# Patient Record
Sex: FEMALE | Race: WHITE | NOT HISPANIC OR LATINO
[De-identification: names, ages, dates, MRNs, and addresses within clinical notes are randomized per-mention and may not be internally consistent; named-entity substitution may affect disease eponyms.]

---

## 2017-10-03 ENCOUNTER — APPOINTMENT (OUTPATIENT)
Dept: OBGYN | Facility: CLINIC | Age: 67
End: 2017-10-03
Payer: MEDICARE

## 2017-10-03 VITALS
SYSTOLIC BLOOD PRESSURE: 116 MMHG | HEIGHT: 61 IN | WEIGHT: 123 LBS | DIASTOLIC BLOOD PRESSURE: 66 MMHG | BODY MASS INDEX: 23.22 KG/M2

## 2017-10-03 PROCEDURE — 99214 OFFICE O/P EST MOD 30 MIN: CPT

## 2017-12-19 ENCOUNTER — APPOINTMENT (OUTPATIENT)
Dept: OBGYN | Facility: CLINIC | Age: 67
End: 2017-12-19
Payer: MEDICARE

## 2017-12-19 VITALS
BODY MASS INDEX: 22.66 KG/M2 | HEIGHT: 61 IN | WEIGHT: 120 LBS | SYSTOLIC BLOOD PRESSURE: 112 MMHG | DIASTOLIC BLOOD PRESSURE: 70 MMHG

## 2017-12-19 LAB
BILIRUB UR QL STRIP: NORMAL
COLLECTION METHOD: NORMAL
GLUCOSE UR-MCNC: NORMAL
HCG UR QL: 0.2 EU/DL
HGB UR QL STRIP.AUTO: NORMAL
KETONES UR-MCNC: NORMAL
LEUKOCYTE ESTERASE UR QL STRIP: NORMAL
NITRITE UR QL STRIP: NORMAL
PH UR STRIP: 6
PROT UR STRIP-MCNC: NORMAL
SP GR UR STRIP: <=1.005

## 2017-12-19 PROCEDURE — 99406 BEHAV CHNG SMOKING 3-10 MIN: CPT

## 2017-12-19 PROCEDURE — 81003 URINALYSIS AUTO W/O SCOPE: CPT | Mod: QW

## 2017-12-19 PROCEDURE — G0101: CPT

## 2017-12-26 LAB — CYTOLOGY CVX/VAG DOC THIN PREP: NORMAL

## 2018-05-04 ENCOUNTER — EMERGENCY (EMERGENCY)
Facility: HOSPITAL | Age: 68
LOS: 1 days | End: 2018-05-04
Payer: MEDICARE

## 2018-05-04 PROCEDURE — 70450 CT HEAD/BRAIN W/O DYE: CPT | Mod: 26

## 2018-05-04 PROCEDURE — 72125 CT NECK SPINE W/O DYE: CPT | Mod: 26

## 2018-05-04 PROCEDURE — 99285 EMERGENCY DEPT VISIT HI MDM: CPT

## 2018-05-04 PROCEDURE — 71045 X-RAY EXAM CHEST 1 VIEW: CPT | Mod: 26

## 2018-05-15 ENCOUNTER — OUTPATIENT (OUTPATIENT)
Dept: OUTPATIENT SERVICES | Facility: HOSPITAL | Age: 68
LOS: 1 days | End: 2018-05-15

## 2018-08-09 ENCOUNTER — OUTPATIENT (OUTPATIENT)
Dept: OUTPATIENT SERVICES | Facility: HOSPITAL | Age: 68
LOS: 1 days | End: 2018-08-09

## 2018-12-03 ENCOUNTER — OTHER (OUTPATIENT)
Age: 68
End: 2018-12-03

## 2018-12-20 ENCOUNTER — APPOINTMENT (OUTPATIENT)
Dept: OBGYN | Facility: CLINIC | Age: 68
End: 2018-12-20
Payer: MEDICARE

## 2018-12-20 VITALS
DIASTOLIC BLOOD PRESSURE: 66 MMHG | WEIGHT: 122 LBS | BODY MASS INDEX: 23.03 KG/M2 | HEIGHT: 61 IN | SYSTOLIC BLOOD PRESSURE: 115 MMHG

## 2018-12-20 LAB
BILIRUB UR QL STRIP: NORMAL
COLLECTION METHOD: NORMAL
GLUCOSE UR-MCNC: NORMAL
HCG UR QL: 0.2 EU/DL
HGB UR QL STRIP.AUTO: NORMAL
KETONES UR-MCNC: NORMAL
LEUKOCYTE ESTERASE UR QL STRIP: NORMAL
NITRITE UR QL STRIP: NORMAL
PH UR STRIP: 6
PROT UR STRIP-MCNC: NORMAL
SP GR UR STRIP: 1.01

## 2018-12-20 PROCEDURE — 81003 URINALYSIS AUTO W/O SCOPE: CPT | Mod: QW

## 2018-12-20 PROCEDURE — 99214 OFFICE O/P EST MOD 30 MIN: CPT

## 2018-12-20 RX ORDER — MELATONIN 3 MG
TABLET ORAL
Refills: 0 | Status: ACTIVE | COMMUNITY

## 2018-12-20 RX ORDER — RANITIDINE 75 MG/1
TABLET ORAL
Refills: 0 | Status: ACTIVE | COMMUNITY

## 2018-12-26 LAB — CYTOLOGY CVX/VAG DOC THIN PREP: NORMAL

## 2019-09-27 ENCOUNTER — TRANSCRIPTION ENCOUNTER (OUTPATIENT)
Age: 69
End: 2019-09-27

## 2020-05-06 ENCOUNTER — APPOINTMENT (OUTPATIENT)
Dept: OBGYN | Facility: CLINIC | Age: 70
End: 2020-05-06

## 2020-09-29 ENCOUNTER — OUTPATIENT (OUTPATIENT)
Dept: OUTPATIENT SERVICES | Facility: HOSPITAL | Age: 70
LOS: 1 days | End: 2020-09-29

## 2020-10-12 ENCOUNTER — APPOINTMENT (OUTPATIENT)
Dept: MRI IMAGING | Facility: CLINIC | Age: 70
End: 2020-10-12
Payer: MEDICARE

## 2020-10-12 PROCEDURE — 73721 MRI JNT OF LWR EXTRE W/O DYE: CPT | Mod: LT

## 2021-09-13 ENCOUNTER — EMERGENCY (EMERGENCY)
Facility: HOSPITAL | Age: 71
LOS: 1 days | End: 2021-09-13
Admitting: EMERGENCY MEDICINE
Payer: MEDICARE

## 2021-09-13 ENCOUNTER — TRANSCRIPTION ENCOUNTER (OUTPATIENT)
Age: 71
End: 2021-09-13

## 2021-09-13 PROCEDURE — 99283 EMERGENCY DEPT VISIT LOW MDM: CPT | Mod: CS

## 2021-09-14 ENCOUNTER — TRANSCRIPTION ENCOUNTER (OUTPATIENT)
Age: 71
End: 2021-09-14

## 2021-09-14 ENCOUNTER — APPOINTMENT (OUTPATIENT)
Dept: DISASTER EMERGENCY | Facility: HOSPITAL | Age: 71
End: 2021-09-14

## 2021-09-14 ENCOUNTER — OUTPATIENT (OUTPATIENT)
Dept: INPATIENT UNIT | Facility: HOSPITAL | Age: 71
LOS: 1 days | End: 2021-09-14
Payer: MEDICARE

## 2021-09-14 VITALS
TEMPERATURE: 98 F | HEART RATE: 80 BPM | RESPIRATION RATE: 18 BRPM | SYSTOLIC BLOOD PRESSURE: 90 MMHG | DIASTOLIC BLOOD PRESSURE: 58 MMHG | OXYGEN SATURATION: 96 %

## 2021-09-14 VITALS
DIASTOLIC BLOOD PRESSURE: 72 MMHG | HEART RATE: 86 BPM | TEMPERATURE: 99 F | SYSTOLIC BLOOD PRESSURE: 107 MMHG | OXYGEN SATURATION: 96 % | RESPIRATION RATE: 18 BRPM

## 2021-09-14 DIAGNOSIS — U07.1 COVID-19: ICD-10-CM

## 2021-09-14 PROCEDURE — M0243: CPT

## 2021-09-14 RX ORDER — SODIUM CHLORIDE 9 MG/ML
250 INJECTION INTRAMUSCULAR; INTRAVENOUS; SUBCUTANEOUS
Refills: 0 | Status: COMPLETED | OUTPATIENT
Start: 2021-09-14 | End: 2021-09-14

## 2021-09-14 RX ADMIN — SODIUM CHLORIDE 310 MILLILITER(S): 9 INJECTION INTRAMUSCULAR; INTRAVENOUS; SUBCUTANEOUS at 15:08

## 2021-09-14 NOTE — CHART NOTE - NSCHARTNOTEFT_GEN_A_CORE
CC: Monoclonal Antibody Infusion/COVID 19 Positive  71yFemale with pmhx of hypothyroid presents for monoclonal antibody infusion. Patient endorses onset of symptoms 9/9 consisting of HA, fever & cough, tested + for COVID 9/13, and referred by  provider for infusion. Patient is unvaccinated.    exam/findings:  T(C): 37.2 (09-14-21 @ 13:19), Max: 37.2 (09-14-21 @ 13:19)  HR: 86 (09-14-21 @ 13:19) (86 - 86)  BP: 107/72 (09-14-21 @ 13:19) (107/72 - 107/72)  RR: 18 (09-14-21 @ 13:19) (18 - 18)  SpO2: 96% (09-14-21 @ 13:19) (96% - 96%)      PE:   Appearance: NAD		  Skin: warm and dry  Neurologic: Non-focal  Extremities: Normal range of motion,    ASSESSMENT:  Pt is a 71yFemale with pmhx of hypothyroid, tested + for COVID 9/13, and referred by  provider to infusion center for Monoclonal antibody infusion (Casirivimab/imdevimab)  Symptoms/ Criteria:  HA, fever & cough  Risk Profile includes: Age >65    PLAN:  - Infusion procedure explained to patient   - Consent for monoclonal antibody infusion obtained   - Risk & benefits discussed/all questions answered  - Infuse Casirivimab/imdevimab 600mg IV over 21 mins   - Observe patient for one hour post infusion    I have reviewed the Casirivimab/imdevimab Emergency Use Authorization (EUA) and I have provided the patient or patient's caregiver with the following information:      1. FDA has authorized emergency use Casirivimab/imdevimab, which is not an FDA-approved biological product.  2. The patient or patient's caregiver has the option to accept or refuse administration of Casirivimab/imdevimab.   3. The significant known and potential risks and benefits of Casirivimab/imdevimab and the extent to which such risks and benefits are unknown.  4. Information on available alternative treatments and risks and benefits of those alternatives.

## 2021-09-16 ENCOUNTER — TRANSCRIPTION ENCOUNTER (OUTPATIENT)
Age: 71
End: 2021-09-16

## 2021-10-14 ENCOUNTER — APPOINTMENT (OUTPATIENT)
Dept: OBGYN | Facility: CLINIC | Age: 71
End: 2021-10-14
Payer: MEDICARE

## 2021-10-14 ENCOUNTER — NON-APPOINTMENT (OUTPATIENT)
Age: 71
End: 2021-10-14

## 2021-10-14 VITALS
BODY MASS INDEX: 23.6 KG/M2 | HEIGHT: 61 IN | DIASTOLIC BLOOD PRESSURE: 76 MMHG | SYSTOLIC BLOOD PRESSURE: 116 MMHG | WEIGHT: 125 LBS

## 2021-10-14 DIAGNOSIS — Z01.419 ENCOUNTER FOR GYNECOLOGICAL EXAMINATION (GENERAL) (ROUTINE) W/OUT ABNORMAL FINDINGS: ICD-10-CM

## 2021-10-14 PROCEDURE — G0101: CPT

## 2021-10-14 PROCEDURE — 82270 OCCULT BLOOD FECES: CPT

## 2021-10-14 NOTE — PHYSICAL EXAM
[Appropriately responsive] : appropriately responsive [Alert] : alert [No Acute Distress] : no acute distress [No Murmurs] : no murmurs [Soft] : soft [Non-tender] : non-tender [Non-distended] : non-distended [No HSM] : No HSM [No Lesions] : no lesions [No Mass] : no mass [Oriented x3] : oriented x3 [Examination Of The Breasts] : a normal appearance [No Masses] : no breast masses were palpable [Labia Majora] : normal [Labia Minora] : normal [Normal] : normal [Uterine Adnexae] : normal [No Tenderness] : no tenderness [Nl Sphincter Tone] : normal sphincter tone [FreeTextEntry9] : GUAIAC NEGATIVE

## 2021-10-14 NOTE — HISTORY OF PRESENT ILLNESS
[Currently Active] : currently active [Men] : men [No] : No [FreeTextEntry1] : JUST RECOVERED FROM COVID19 INFECTION 9/2021, HAD MONOCLONAL ANTIBODY INFUSION.  SON ALSO HAD INFECTION.  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

## 2021-10-21 LAB — CYTOLOGY CVX/VAG DOC THIN PREP: ABNORMAL

## 2022-06-14 ENCOUNTER — NON-APPOINTMENT (OUTPATIENT)
Age: 72
End: 2022-06-14

## 2022-08-11 ENCOUNTER — APPOINTMENT (OUTPATIENT)
Dept: OBGYN | Facility: CLINIC | Age: 72
End: 2022-08-11

## 2022-08-11 VITALS
WEIGHT: 127 LBS | DIASTOLIC BLOOD PRESSURE: 66 MMHG | HEIGHT: 61 IN | SYSTOLIC BLOOD PRESSURE: 118 MMHG | BODY MASS INDEX: 23.98 KG/M2

## 2022-08-11 PROCEDURE — 36415 COLL VENOUS BLD VENIPUNCTURE: CPT

## 2022-08-11 PROCEDURE — 99214 OFFICE O/P EST MOD 30 MIN: CPT

## 2022-08-11 NOTE — PLAN
[FreeTextEntry1] : OSTEOPOROSIS, RESISTANCE BANDS WITH EXERCISES GIVEN, PT OFFERED.  \par INCREASE SUPPLEMENTS TO D3 2,000 IU DAILY, PENDING SERUM LEVELS DONE TODAY.\par CONSIDER EVISTA.

## 2022-08-11 NOTE — HISTORY OF PRESENT ILLNESS
[postmenopausal] : postmenopausal [Post-Menopause, No Sxs] : post-menopausal, currently without symptoms [No] : Patient does not have concerns regarding sex [Y] : Positive pregnancy history [Currently Active] : currently active [Mammogramdate] : 5/9/22 [TextBox_19] : BR1 [BreastSonogramDate] : 5/9/22 [TextBox_25] : BR1 [PapSmeardate] : 10/14/21 [TextBox_31] : NEG [BoneDensityDate] : 5/9/22 [TextBox_37] : OSTEOPOROSIS [ColonoscopyDate] : 2018 [LMPDate] : 2004 [PGHxTotal] : 3 [[facility] CenterlTerm] : 3 [[facility] Phoenixiving] : 3 [FreeTextEntry1] : 2004

## 2022-08-15 LAB — 25(OH)D3 SERPL-MCNC: 42.5 NG/ML

## 2022-08-17 ENCOUNTER — NON-APPOINTMENT (OUTPATIENT)
Age: 72
End: 2022-08-17

## 2023-05-08 ENCOUNTER — APPOINTMENT (OUTPATIENT)
Dept: OBGYN | Facility: CLINIC | Age: 73
End: 2023-05-08
Payer: MEDICARE

## 2023-05-08 VITALS
DIASTOLIC BLOOD PRESSURE: 70 MMHG | WEIGHT: 130 LBS | SYSTOLIC BLOOD PRESSURE: 130 MMHG | HEIGHT: 61 IN | BODY MASS INDEX: 24.55 KG/M2

## 2023-05-08 DIAGNOSIS — M85.80 OTHER SPECIFIED DISORDERS OF BONE DENSITY AND STRUCTURE, UNSPECIFIED SITE: ICD-10-CM

## 2023-05-08 DIAGNOSIS — Z12.11 ENCOUNTER FOR SCREENING FOR MALIGNANT NEOPLASM OF COLON: ICD-10-CM

## 2023-05-08 PROCEDURE — 99214 OFFICE O/P EST MOD 30 MIN: CPT

## 2023-05-08 NOTE — HISTORY OF PRESENT ILLNESS
[TextBox_4] : ANNUAL [Mammogramdate] : 5/2022 [TextBox_19] : BR 1, HETEROGENEOUSLY DENSE [BreastSonogramDate] : 5/2022 [PapSmeardate] : 11/14/21 [TextBox_31] : ATROPHIC [BoneDensityDate] : 5/25/22 [TextBox_37] : OSTEOPOROSIS  [ColonoscopyDate] : 2018 [LMPDate] : 2004 [TextBox_6] : 2004 [FreeTextEntry1] : 2004 [No] : Patient does not have concerns regarding sex [Currently Active] : currently active [FreeTextEntry2] : LONG TERM PARTNER

## 2023-05-08 NOTE — PLAN
[FreeTextEntry1] : Results of bone density reviewed with patient.  We discussed diet, exercise, calcium, vitamin D, smoking cessation, family history.  Fall precautions given. Discussed anti-resorptive agents, hormones and hormone agonists and expectant management.\par CONTINUE VITAMIN D3, EXERCISE AND F/U DEXA 2024.\par \par DENSE BREASTS, CONSIDER BREAST SONOGRAM WITH SCREENING MAMMOGRAM.

## 2023-12-04 ENCOUNTER — OFFICE (OUTPATIENT)
Dept: URBAN - METROPOLITAN AREA CLINIC 38 | Facility: CLINIC | Age: 73
Setting detail: OPHTHALMOLOGY
End: 2023-12-04
Payer: MEDICARE

## 2023-12-04 DIAGNOSIS — H35.372: ICD-10-CM

## 2023-12-04 DIAGNOSIS — H43.813: ICD-10-CM

## 2023-12-04 DIAGNOSIS — H40.033: ICD-10-CM

## 2023-12-04 DIAGNOSIS — H25.13: ICD-10-CM

## 2023-12-04 PROCEDURE — 92014 COMPRE OPH EXAM EST PT 1/>: CPT

## 2023-12-04 ASSESSMENT — REFRACTION_AUTOREFRACTION
OD_SPHERE: +1.50
OD_AXIS: 014
OS_CYLINDER: -0.75
OS_AXIS: 121
OS_SPHERE: +0.25
OD_CYLINDER: -0.25

## 2023-12-04 ASSESSMENT — SPHEQUIV_DERIVED
OS_SPHEQUIV: 0.625
OD_SPHEQUIV: 1.125
OD_SPHEQUIV: 1.375
OS_SPHEQUIV: -0.125
OS_SPHEQUIV: 0.125

## 2023-12-04 ASSESSMENT — REFRACTION_CURRENTRX
OS_CYLINDER: -0.25
OD_CYLINDER: SPH
OD_ADD: +2.75
OD_SPHERE: +1.25
OS_CYLINDER: SPH
OS_SPHERE: +1.00
OS_VPRISM_DIRECTION: PROGS
OS_SPHERE: +1.00
OD_VPRISM_DIRECTION: PROGS
OS_ADD: +2.25
OD_VPRISM_DIRECTION: PROGS
OD_CYLINDER: -0.25
OD_OVR_VA: 20/
OS_OVR_VA: 20/
OS_ADD: +2.50
OD_SPHERE: +1.25
OS_OVR_VA: 20/
OS_AXIS: 121
OD_OVR_VA: 20/
OS_VPRISM_DIRECTION: PROGS
OD_AXIS: 028
OD_ADD: +2.25

## 2023-12-04 ASSESSMENT — REFRACTION_MANIFEST
OS_AXIS: 125
OD_VA2: 20/30(J2)
OS_CYLINDER: -0.25
OS_ADD: +2.75
OS_VA1: 20/25-2
OU_VA: 20/20-1
OS_SPHERE: +0.50
OS_VA2: 20/25(J1)
OS_ADD: +2.75
OS_VA1: 20/25-2
OD_VA1: 20/20
OD_VA2: 20/25(J1)
OD_VA1: 20/20
OD_SPHERE: +1.25
OS_CYLINDER: -0.75
OU_VA: 20/25-2
OD_ADD: +2.75
OD_AXIS: 30
OD_CYLINDER: -0.25
OS_AXIS: 120
OD_ADD: +2.75
OS_VA2: 20/30(J2)
OD_SPHERE: +1.25
OS_SPHERE: +0.75
OD_CYLINDER: SPH

## 2023-12-04 ASSESSMENT — CONFRONTATIONAL VISUAL FIELD TEST (CVF)
OS_FINDINGS: FULL
OD_FINDINGS: FULL

## 2024-05-10 ENCOUNTER — APPOINTMENT (OUTPATIENT)
Dept: OBGYN | Facility: CLINIC | Age: 74
End: 2024-05-10

## 2024-05-10 VITALS
BODY MASS INDEX: 25.11 KG/M2 | HEIGHT: 61 IN | SYSTOLIC BLOOD PRESSURE: 130 MMHG | DIASTOLIC BLOOD PRESSURE: 80 MMHG | WEIGHT: 133 LBS

## 2024-05-10 DIAGNOSIS — N90.89 OTHER SPECIFIED NONINFLAMMATORY DISORDERS OF VULVA AND PERINEUM: ICD-10-CM

## 2024-05-10 DIAGNOSIS — Z63.4 DISAPPEARANCE AND DEATH OF FAMILY MEMBER: ICD-10-CM

## 2024-05-10 DIAGNOSIS — Z12.39 ENCOUNTER FOR OTHER SCREENING FOR MALIGNANT NEOPLASM OF BREAST: ICD-10-CM

## 2024-05-10 DIAGNOSIS — R92.30 DENSE BREASTS, UNSPECIFIED: ICD-10-CM

## 2024-05-10 DIAGNOSIS — Z78.9 OTHER SPECIFIED HEALTH STATUS: ICD-10-CM

## 2024-05-10 DIAGNOSIS — M81.0 AGE-RELATED OSTEOPOROSIS W/OUT CURRENT PATHOLOGICAL FRACTURE: ICD-10-CM

## 2024-05-10 DIAGNOSIS — Z12.4 ENCOUNTER FOR SCREENING FOR MALIGNANT NEOPLASM OF CERVIX: ICD-10-CM

## 2024-05-10 DIAGNOSIS — Z01.411 ENCOUNTER FOR GYNECOLOGICAL EXAMINATION (GENERAL) (ROUTINE) WITH ABNORMAL FINDINGS: ICD-10-CM

## 2024-05-10 PROCEDURE — 99459 PELVIC EXAMINATION: CPT

## 2024-05-10 PROCEDURE — G0101: CPT

## 2024-05-10 PROCEDURE — 99397 PER PM REEVAL EST PAT 65+ YR: CPT | Mod: GY

## 2024-05-10 RX ORDER — COLD-HOT PACK
EACH MISCELLANEOUS
Refills: 0 | Status: ACTIVE | COMMUNITY

## 2024-05-10 RX ORDER — MULTIVIT-MIN/IRON/FOLIC ACID/K 18-600-40
CAPSULE ORAL
Refills: 0 | Status: ACTIVE | COMMUNITY

## 2024-05-10 SDOH — SOCIAL STABILITY - SOCIAL INSECURITY: DISSAPEARANCE AND DEATH OF FAMILY MEMBER: Z63.4

## 2024-05-10 NOTE — PHYSICAL EXAM
[Chaperone Present] : A chaperone was present in the examining room during all aspects of the physical examination [[id number]] : A chaperone was present during the pelvic exam. [Appropriately responsive] : appropriately responsive [Alert] : alert [No Acute Distress] : no acute distress [Soft] : soft [Non-tender] : non-tender [Non-distended] : non-distended [No HSM] : No HSM [No Lesions] : no lesions [No Mass] : no mass [Oriented x3] : oriented x3 [Examination Of The Breasts] : a normal appearance [No Masses] : no breast masses were palpable [Labia Majora] : normal [Labia Minora] : normal [Atrophy] : atrophy [Normal] : normal [Uterine Adnexae] : non-palpable [FreeTextEntry2] : ADE Couch [FreeTextEntry1] : hypopigmented rough appearing lesion to right anterior labia majora/clitorial yañez region.  Slight hypopigmentation with mild fissuring to middle anterior labia. pts 'bump' to left labia is a 1mm sebaceous cyst. No other lesions noted. [FreeTextEntry9] : deferred. pt having colonoscopy in 2 weeks

## 2024-05-10 NOTE — PLAN
[FreeTextEntry1] : preventative - mammo/sono rx provided - self breast exams encouraged - DEXA rx provided - The benefits of adequate calcium and vitamin D3 intake combined with weight bearing exercise for bone protection were reviewed. - pap obtained, discussed ASCCP guidelines  vulvar lesion - two hypopigmented areas, vulvar biopsy offered today vs treating for possible fungal infection - f/u in 3-4 weeks, if lesions present, will perform vulvar biopsy - differentials discussed- discussed need to r/o LS.  Low suspicion for vulvar ca, but this was discussed as well

## 2024-05-10 NOTE — HISTORY OF PRESENT ILLNESS
[N] : Patient reports normal menses [Y] : Positive pregnancy history [Menarche Age: ____] : age at menarche was [unfilled] [Currently Active] : currently active [Men] : men [TextBox_4] : Antonina is here for her annual visit. She felt two small bump on her left labia while in the shower a few days ago.  She was not able to feel the bumps when she was dry and out of the shower. they do not bother her, she just happened to notice while showering. She makes her own soap so is worried the soap is causing an issue. [Mammogramdate] : 05/10/2023 [TextBox_19] : BR1 [BreastSonogramDate] : 05/10/2023 [TextBox_25] : BR1 [PapSmeardate] : 10/14/2021 [TextBox_31] : Negative [BoneDensityDate] : 05/25/2022 [TextBox_37] : Osteoporosis [ColonoscopyDate] : 08/20/2018 [TextBox_43] : Polyp, pt was told to repeat colonoscopy in 5 years [de-identified] : had a Tubal Ligation. [LMPDate] : 2004 [PGHxTotal] : 3 [[facility] for IncurableslTerm] : 3 [[facility] Hospitaliving] : 3 [FreeTextEntry1] : 2004

## 2024-05-18 LAB
CYTOLOGY CVX/VAG DOC THIN PREP: ABNORMAL
HPV HIGH+LOW RISK DNA PNL CVX: NOT DETECTED

## 2024-06-07 ENCOUNTER — APPOINTMENT (OUTPATIENT)
Dept: OBGYN | Facility: CLINIC | Age: 74
End: 2024-06-07
Payer: MEDICARE

## 2024-06-07 VITALS
WEIGHT: 131 LBS | HEIGHT: 61 IN | BODY MASS INDEX: 24.73 KG/M2 | DIASTOLIC BLOOD PRESSURE: 84 MMHG | SYSTOLIC BLOOD PRESSURE: 118 MMHG

## 2024-06-07 PROCEDURE — 56605 BIOPSY OF VULVA/PERINEUM: CPT

## 2024-06-07 NOTE — PHYSICAL EXAM
[Chaperone Present] : A chaperone was present in the examining room during all aspects of the physical examination [41291] : A chaperone was present during the pelvic exam. [FreeTextEntry2] : ADE Martinez [Appropriately responsive] : appropriately responsive [Alert] : alert [No Acute Distress] : no acute distress [Oriented x3] : oriented x3 [FreeTextEntry1] : slight anterior hypopigmentation noted, improved since last visit.

## 2024-06-07 NOTE — HISTORY OF PRESENT ILLNESS
[N] : Patient reports normal menses [Y] : Positive pregnancy history [Menarche Age: ____] : age at menarche was [unfilled] [No] : Patient does not have concerns regarding sex [Currently Active] : currently active [Men] : men [TextBox_4] : Antonina is here for a recheck of her vulva after noting some hypopigmentation at the time of her annual exam. She used lotrisone as it was thought the area may be related to a fungal infection.  She does not feel anything/any different since using the cream   [Mammogramdate] : 05/13/24 [TextBox_19] : br -1 [BreastSonogramDate] : 05/13/24 [TextBox_25] : br -1 [PapSmeardate] : 05/10/24 [TextBox_31] : neg -  [BoneDensityDate] : 05/13/24 [TextBox_37] :  osteoporosis  [ColonoscopyDate] : 05/30/24 [TextBox_43] : POLYPS BENIGN  [HPVDate] : 05/10/24 [TextBox_78] : NEG -  [LMPDate] : 12/2004 [PGHxTotal] : 3 [[facility]allTerm] : 3 [[facility] Hospitaliving] : 3 [FreeTextEntry1] : 12/2004

## 2024-06-28 ENCOUNTER — NON-APPOINTMENT (OUTPATIENT)
Age: 74
End: 2024-06-28

## 2024-06-29 ENCOUNTER — APPOINTMENT (OUTPATIENT)
Dept: OBGYN | Facility: CLINIC | Age: 74
End: 2024-06-29

## 2024-06-29 DIAGNOSIS — L90.0 LICHEN SCLEROSUS ET ATROPHICUS: ICD-10-CM

## 2024-06-29 DIAGNOSIS — L81.9 DISORDER OF PIGMENTATION, UNSPECIFIED: ICD-10-CM

## 2024-06-29 PROCEDURE — 99442: CPT | Mod: 93

## 2024-07-01 LAB — CORE LAB BIOPSY: NORMAL

## 2024-08-02 ENCOUNTER — APPOINTMENT (OUTPATIENT)
Dept: OBGYN | Facility: CLINIC | Age: 74
End: 2024-08-02
Payer: MEDICARE

## 2024-08-02 VITALS
SYSTOLIC BLOOD PRESSURE: 120 MMHG | HEIGHT: 61 IN | BODY MASS INDEX: 24.17 KG/M2 | WEIGHT: 128 LBS | DIASTOLIC BLOOD PRESSURE: 78 MMHG

## 2024-08-02 DIAGNOSIS — L90.0 LICHEN SCLEROSUS ET ATROPHICUS: ICD-10-CM

## 2024-08-02 PROCEDURE — 99213 OFFICE O/P EST LOW 20 MIN: CPT

## 2024-08-02 NOTE — HISTORY OF PRESENT ILLNESS
[N] : Patient reports normal menses [Y] : Positive pregnancy history [Menarche Age: ____] : age at menarche was [unfilled] [No] : Patient does not have concerns regarding sex [Currently Active] : currently active [Men] : men [TextBox_4] : Antonina is here for a vulvar check. she was found to have LS and was started on clobetasol- has been using it daily. Denies itching or any new lesions, although difficult for her to see the area.   [Mammogramdate] : 05/13/24 [TextBox_19] : br -1 [BreastSonogramDate] : 05/13/24 [TextBox_25] : br -1 [PapSmeardate] : 05/10/24 [TextBox_31] : neg -  [BoneDensityDate] : 05/13/24 [TextBox_37] :  osteoporosis  [ColonoscopyDate] : 05/30/24 [TextBox_43] : POLYPS BENIGN  [HPVDate] : 05/10/24 [TextBox_78] : NEG -  [LMPDate] : 12/2004 [PGHxTotal] : 3 [[facility]iumlTerm] : 3 [[facility] Centeriving] : 3 [FreeTextEntry1] : 12/2004

## 2024-08-02 NOTE — PLAN
[FreeTextEntry1] : continue clobetasol, ok to go down to 2-3x per week f/u for any new symptoms or discoloration f/u at yearly exam for recheck.

## 2025-05-21 ENCOUNTER — APPOINTMENT (OUTPATIENT)
Dept: OBGYN | Facility: CLINIC | Age: 75
End: 2025-05-21

## 2025-05-21 VITALS
SYSTOLIC BLOOD PRESSURE: 120 MMHG | BODY MASS INDEX: 23.98 KG/M2 | DIASTOLIC BLOOD PRESSURE: 70 MMHG | WEIGHT: 127 LBS | HEIGHT: 61 IN

## 2025-05-21 DIAGNOSIS — Z01.419 ENCOUNTER FOR GYNECOLOGICAL EXAMINATION (GENERAL) (ROUTINE) W/OUT ABNORMAL FINDINGS: ICD-10-CM

## 2025-05-21 DIAGNOSIS — Z12.39 ENCOUNTER FOR OTHER SCREENING FOR MALIGNANT NEOPLASM OF BREAST: ICD-10-CM

## 2025-05-21 DIAGNOSIS — R92.30 DENSE BREASTS, UNSPECIFIED: ICD-10-CM

## 2025-05-21 DIAGNOSIS — L90.0 LICHEN SCLEROSUS ET ATROPHICUS: ICD-10-CM

## 2025-05-21 PROCEDURE — 99397 PER PM REEVAL EST PAT 65+ YR: CPT | Mod: GY

## 2025-05-21 PROCEDURE — G0101: CPT

## 2025-05-21 RX ORDER — FAMOTIDINE 10 MG/1
TABLET, FILM COATED ORAL
Refills: 0 | Status: ACTIVE | COMMUNITY

## 2025-08-12 ENCOUNTER — NON-APPOINTMENT (OUTPATIENT)
Age: 75
End: 2025-08-12